# Patient Record
Sex: MALE | URBAN - METROPOLITAN AREA
[De-identification: names, ages, dates, MRNs, and addresses within clinical notes are randomized per-mention and may not be internally consistent; named-entity substitution may affect disease eponyms.]

---

## 2023-10-16 ENCOUNTER — OFFICE (OUTPATIENT)
Dept: URBAN - METROPOLITAN AREA CLINIC 28 | Facility: CLINIC | Age: 68
Setting detail: OPHTHALMOLOGY
End: 2023-10-16
Payer: MEDICARE

## 2023-10-16 DIAGNOSIS — H25.13: ICD-10-CM

## 2023-10-16 DIAGNOSIS — H43.813: ICD-10-CM

## 2023-10-16 DIAGNOSIS — H40.033: ICD-10-CM

## 2023-10-16 PROCEDURE — 92014 COMPRE OPH EXAM EST PT 1/>: CPT | Performed by: OPHTHALMOLOGY

## 2023-10-16 PROCEDURE — 92020 GONIOSCOPY: CPT | Performed by: OPHTHALMOLOGY

## 2023-10-16 ASSESSMENT — REFRACTION_CURRENTRX
OS_OVR_VA: 20/
OS_CYLINDER: SPH
OS_SPHERE: +3.00
OD_CYLINDER: SPH
OD_OVR_VA: 20/
OD_SPHERE: +3.50

## 2023-10-16 ASSESSMENT — REFRACTION_AUTOREFRACTION
OS_SPHERE: -0.25
OS_CYLINDER: -0.50
OD_AXIS: 000
OD_SPHERE: +0.75
OS_AXIS: 149
OD_CYLINDER: SPH

## 2023-10-16 ASSESSMENT — KERATOMETRY
OD_K2POWER_DIOPTERS: 42.50
OD_AXISANGLE_DEGREES: 097
OS_K2POWER_DIOPTERS: 42.75
OD_K1POWER_DIOPTERS: 41.25
OS_K1POWER_DIOPTERS: 41.25
OS_AXISANGLE_DEGREES: 078
METHOD_AUTO_MANUAL: AUTO

## 2023-10-16 ASSESSMENT — VISUAL ACUITY
OS_BCVA: 20/30-
OD_BCVA: 20/25

## 2023-10-16 ASSESSMENT — TONOMETRY
OD_IOP_MMHG: 12
OS_IOP_MMHG: 15

## 2023-10-16 ASSESSMENT — SPHEQUIV_DERIVED: OS_SPHEQUIV: -0.5

## 2023-10-16 ASSESSMENT — AXIALLENGTH_DERIVED: OS_AL: 24.362

## 2023-10-16 ASSESSMENT — CONFRONTATIONAL VISUAL FIELD TEST (CVF)
OD_FINDINGS: FULL
OS_FINDINGS: FULL

## 2024-05-05 ENCOUNTER — EMERGENCY (EMERGENCY)
Facility: HOSPITAL | Age: 69
LOS: 1 days | Discharge: HOME | End: 2024-05-05
Admitting: EMERGENCY MEDICINE
Payer: MEDICARE

## 2024-05-05 VITALS
RESPIRATION RATE: 98 BRPM | HEIGHT: 72 IN | TEMPERATURE: 98 F | SYSTOLIC BLOOD PRESSURE: 132 MMHG | HEART RATE: 71 BPM | OXYGEN SATURATION: 97 % | DIASTOLIC BLOOD PRESSURE: 81 MMHG | WEIGHT: 185.19 LBS

## 2024-05-05 VITALS — RESPIRATION RATE: 18 BRPM

## 2024-05-05 DIAGNOSIS — H57.89 OTHER SPECIFIED DISORDERS OF EYE AND ADNEXA: ICD-10-CM

## 2024-05-05 DIAGNOSIS — H10.9 UNSPECIFIED CONJUNCTIVITIS: ICD-10-CM

## 2024-05-05 DIAGNOSIS — H57.02 ANISOCORIA: ICD-10-CM

## 2024-05-05 DIAGNOSIS — Z88.0 ALLERGY STATUS TO PENICILLIN: ICD-10-CM

## 2024-05-05 DIAGNOSIS — H11.422 CONJUNCTIVAL EDEMA, LEFT EYE: ICD-10-CM

## 2024-05-05 PROCEDURE — 99284 EMERGENCY DEPT VISIT MOD MDM: CPT

## 2024-05-05 PROCEDURE — 99283 EMERGENCY DEPT VISIT LOW MDM: CPT

## 2024-05-05 RX ORDER — MOXIFLOXACIN HCL 0.5 %
1 DROPS OPHTHALMIC (EYE)
Qty: 1 | Refills: 0
Start: 2024-05-05 | End: 2024-05-09

## 2024-05-05 NOTE — ED PROVIDER NOTE - EYES, MLM
left eye +sclera injection, +anisocoria (not new per pt), +chemosis, EOMI, fluorescein stain neg for abrasion/ulcer/fb, visual acuity 20/50 OS, 20/40 OU

## 2024-05-05 NOTE — ED ADULT TRIAGE NOTE - CHIEF COMPLAINT QUOTE
Pt, presents to ER c/o left eye discharge and discomfort without relief from doxycyline for ~1 week. OD-20/30, OS-20-50 and OU-20/40

## 2024-05-05 NOTE — ED PROVIDER NOTE - OBJECTIVE STATEMENT
69 y/o m presents c/o left eye redness, itchiness and discharge x 1 week.  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  Pt stating his symptoms are not improving, the eye looked more red to him today.  Pt reports mild blurriness in the left eye, has been told in the past that his eye pressure is slightly high but wasn't given any drops for it.  Denies contact lenses, fever, recent URI sx, all other ROS negative.

## 2024-05-05 NOTE — ED ADULT NURSE NOTE - NSSEPSISSUSPECTED_ED_A_ED
Homeheatl[facility] care ordered for dc back at [facility]  Pt agreeable to [facility], referral made  No

## 2024-05-05 NOTE — ED PROVIDER NOTE - CLINICAL SUMMARY MEDICAL DECISION MAKING FREE TEXT BOX
67 y/o m presents c/o left eye redness, itchiness and discharge x 1 week.  Fluorescein stain neg, visual acuity slightly less in left eye, discussed with ophthalmology Dr. Bernabe, recommends to switch to moxifloxacin ophthalmic drops, recommend prompt [facility], advised to d/c oral doxycycline.  Pt in agreement with plan.

## 2024-05-05 NOTE — ED PROVIDER NOTE - NSFOLLOWUPINSTRUCTIONS_ED_ALL_ED_FT
Bacterial Conjunctivitis, Adult  Bacterial conjunctivitis is an infection of the clear membrane that covers the white part of the eye and the inner surface of the eyelid (conjunctiva). When the blood vessels in the conjunctiva become inflamed, the eye becomes red or pink. The eye often feels irritated or itchy. Bacterial conjunctivitis spreads easily from person to person (is contagious). It also spreads easily from one eye to the other eye.    What are the causes?  This condition is caused by bacteria. You may get the infection if you come into close contact with:  A person who is infected with the bacteria.  Items that are contaminated with the bacteria, such as a face towel, contact lens solution, or eye makeup.  What increases the risk?  You are more likely to develop this condition if:  You are exposed to other people who have the infection.  You wear contact lenses.  You have a sinus infection.  You have had a recent eye injury or surgery.  You have a weak body defense system (immune system).  You have a medical condition that causes dry eyes.  What are the signs or symptoms?  A normal eye compared to an eye with bacterial conjunctivitis.   Symptoms of this condition include:  Thick, yellowish discharge from the eye. This may turn into a crust on the eyelid overnight and cause your eyelids to stick together.  Tearing or watery eyes.  Itchy eyes.  Burning feeling in your eyes.  Eye redness.  Swollen eyelids.  Blurred vision.  How is this diagnosed?  This condition is diagnosed based on your symptoms and medical history. Your health care provider may also take a sample of discharge from your eye to find the cause of your infection.    How is this treated?  A person putting eye drops in an eye.  This condition may be treated with:  Antibiotic eye drops or ointment to clear the infection more quickly and prevent the spread of infection to others.  Antibiotic medicines taken by mouth (orally) to treat infections that do not respond to drops or ointments or that last longer than 10 days.  Cool, wet cloths (cool compresses) placed on the eyes.  Artificial tears applied 2–6 times a day.  Follow these instructions at home:  Medicines    Take or apply your antibiotic medicine as told by your health care provider. Do not stop using the antibiotic, even if your condition improves, unless directed by your health care provider.  Take or apply over-the-counter and prescription medicines only as told by your health care provider.  Be very careful to avoid touching the edge of your eyelid with the eye-drop bottle or the ointment tube when you apply medicines to the affected eye. This will keep you from spreading the infection to your other eye or to other people.  Managing discomfort    Gently wipe away any drainage from your eye with a warm, wet washcloth or a cotton ball.  Apply a clean, cool compress to your eye for 10–20 minutes, 3–4 times a day.  General instructions    Do not wear contact lenses until the inflammation is gone and your health care provider says it is safe to wear them again. Ask your health care provider how to sterilize or replace your contact lenses before you use them again. Wear glasses until you can resume wearing contact lenses.  Avoid wearing eye makeup until the inflammation is gone. Throw away any old eye cosmetics that may be contaminated.  Change or wash your pillowcase every day.  Do not share towels or washcloths. This may spread the infection.  Wash your hands often with soap and water for at least 20 seconds and especially before touching your face or eyes. Use paper towels to dry your hands.  Avoid touching or rubbing your eyes.  Do not drive or use heavy machinery if your vision is blurred.  Contact a health care provider if:  You have a fever.  Your symptoms do not get better after 10 days.  Get help right away if:  You have a fever and your symptoms suddenly get worse.  You have severe pain when you move your eye.  You have facial pain, redness, or swelling.  You have a sudden loss of vision.  Summary  Bacterial conjunctivitis is an infection of the clear membrane that covers the white part of the eye and the inner surface of the eyelid (conjunctiva).  Bacterial conjunctivitis spreads easily from eye to eye and from person to person (is contagious).  Wash your hands often with soap and water for at least 20 seconds and especially before touching your face or eyes. Use paper towels to dry your hands.  Take or apply your antibiotic medicine as told by your health care provider. Do not stop using the antibiotic even if your condition improves.  Contact a health care provider if you have a fever or if your symptoms do not get better after 10 days. Get help right away if you have a sudden loss of vision.  This information is not intended to replace advice given to you by your health care provider. Make sure you discuss any questions you have with your health care provider.

## 2024-05-14 ENCOUNTER — OFFICE (OUTPATIENT)
Dept: URBAN - METROPOLITAN AREA CLINIC 92 | Facility: CLINIC | Age: 69
Setting detail: OPHTHALMOLOGY
End: 2024-05-14
Payer: MEDICARE

## 2024-05-14 DIAGNOSIS — H16.223: ICD-10-CM

## 2024-05-14 DIAGNOSIS — B30.0: ICD-10-CM

## 2024-05-14 DIAGNOSIS — H25.13: ICD-10-CM

## 2024-05-14 DIAGNOSIS — H43.813: ICD-10-CM

## 2024-05-14 DIAGNOSIS — H40.033: ICD-10-CM

## 2024-05-14 DIAGNOSIS — H18.823: ICD-10-CM

## 2024-05-14 PROCEDURE — 99213 OFFICE O/P EST LOW 20 MIN: CPT | Performed by: OPHTHALMOLOGY

## 2024-05-14 ASSESSMENT — LID EXAM ASSESSMENTS
OS_EDEMA: 1+ 2+
OD_EDEMA: 1+ 2+

## 2024-06-27 ENCOUNTER — OFFICE (OUTPATIENT)
Dept: URBAN - METROPOLITAN AREA CLINIC 28 | Facility: CLINIC | Age: 69
Setting detail: OPHTHALMOLOGY
End: 2024-06-27
Payer: MEDICARE

## 2024-06-27 DIAGNOSIS — H16.223: ICD-10-CM

## 2024-06-27 DIAGNOSIS — B30.0: ICD-10-CM

## 2024-06-27 DIAGNOSIS — H18.823: ICD-10-CM

## 2024-06-27 DIAGNOSIS — H25.13: ICD-10-CM

## 2024-06-27 PROCEDURE — 92012 INTRM OPH EXAM EST PATIENT: CPT | Performed by: OPHTHALMOLOGY

## 2024-06-27 ASSESSMENT — CONFRONTATIONAL VISUAL FIELD TEST (CVF)
OD_FINDINGS: FULL
OS_FINDINGS: FULL

## 2024-06-27 ASSESSMENT — LID EXAM ASSESSMENTS
OS_EDEMA: 1+ 2+
OD_EDEMA: 1+ 2+

## 2024-10-22 ENCOUNTER — OFFICE (OUTPATIENT)
Dept: URBAN - METROPOLITAN AREA CLINIC 28 | Facility: CLINIC | Age: 69
Setting detail: OPHTHALMOLOGY
End: 2024-10-22
Payer: MEDICARE

## 2024-10-22 DIAGNOSIS — B30.0: ICD-10-CM

## 2024-10-22 DIAGNOSIS — H16.223: ICD-10-CM

## 2024-10-22 DIAGNOSIS — H25.13: ICD-10-CM

## 2024-10-22 DIAGNOSIS — H18.823: ICD-10-CM

## 2024-10-22 PROCEDURE — 92012 INTRM OPH EXAM EST PATIENT: CPT | Performed by: OPHTHALMOLOGY

## 2024-10-22 ASSESSMENT — KERATOMETRY
OD_K2POWER_DIOPTERS: 42.50
OS_K1POWER_DIOPTERS: 41.25
OD_K1POWER_DIOPTERS: 41.25
OS_K2POWER_DIOPTERS: 42.75
METHOD_AUTO_MANUAL: AUTO
OS_AXISANGLE_DEGREES: 080
OD_AXISANGLE_DEGREES: 095

## 2024-10-22 ASSESSMENT — REFRACTION_MANIFEST
OD_SPHERE: +0.75
OS_SPHERE: -1.00

## 2024-10-22 ASSESSMENT — REFRACTION_CURRENTRX
OD_OVR_VA: 20/
OS_OVR_VA: 20/
OS_CYLINDER: SPH
OS_SPHERE: +3.00
OD_SPHERE: +3.50
OD_CYLINDER: SPH

## 2024-10-22 ASSESSMENT — VISUAL ACUITY
OD_BCVA: 20/40-2
OS_BCVA: 20/50

## 2024-10-22 ASSESSMENT — REFRACTION_AUTOREFRACTION
OD_AXIS: 032
OS_CYLINDER: -0.50
OS_SPHERE: -0.75
OD_SPHERE: +0.75
OS_AXIS: 010
OD_CYLINDER: -0.25

## 2024-10-22 ASSESSMENT — CONFRONTATIONAL VISUAL FIELD TEST (CVF)
OD_FINDINGS: FULL
OS_FINDINGS: FULL

## 2024-10-22 ASSESSMENT — TONOMETRY
OS_IOP_MMHG: 17
OD_IOP_MMHG: 17

## 2024-10-22 ASSESSMENT — TEAR BREAK UP TIME (TBUT)
OD_TBUT: 2+
OS_TBUT: 2+

## 2024-10-22 ASSESSMENT — LID EXAM ASSESSMENTS
OS_EDEMA: 1+ 2+
OD_EDEMA: 1+ 2+

## 2025-07-08 ENCOUNTER — OFFICE (OUTPATIENT)
Dept: URBAN - METROPOLITAN AREA CLINIC 28 | Facility: CLINIC | Age: 70
Setting detail: OPHTHALMOLOGY
End: 2025-07-08
Payer: MEDICARE

## 2025-07-08 DIAGNOSIS — H40.033: ICD-10-CM

## 2025-07-08 DIAGNOSIS — H16.223: ICD-10-CM

## 2025-07-08 DIAGNOSIS — H43.813: ICD-10-CM

## 2025-07-08 DIAGNOSIS — H25.13: ICD-10-CM

## 2025-07-08 PROCEDURE — 92020 GONIOSCOPY: CPT | Performed by: OPHTHALMOLOGY

## 2025-07-08 PROCEDURE — 92014 COMPRE OPH EXAM EST PT 1/>: CPT | Performed by: OPHTHALMOLOGY

## 2025-07-08 ASSESSMENT — REFRACTION_CURRENTRX
OS_CYLINDER: SPH
OD_SPHERE: +3.50
OS_OVR_VA: 20/
OD_CYLINDER: SPH
OS_SPHERE: +3.00
OD_OVR_VA: 20/

## 2025-07-08 ASSESSMENT — REFRACTION_MANIFEST
OS_CYLINDER: SPH
OS_ADD: +2.25
OD_VA1: 20/30-2
OS_VA1: 20/40-
OS_SPHERE: -1.00
OS_CYLINDER: SPH
OD_SPHERE: +0.75
OD_CYLINDER: SPH
OD_SPHERE: +0.50
OD_CYLINDER: SPH
OS_SPHERE: -0.75
OD_ADD: +2.25
OD_SPHERE: +2.75
OS_SPHERE: +1.50

## 2025-07-08 ASSESSMENT — TONOMETRY
OD_IOP_MMHG: 17
OS_IOP_MMHG: 18

## 2025-07-08 ASSESSMENT — VISUAL ACUITY
OD_BCVA: 20/70-1
OS_BCVA: 20/50-2

## 2025-07-08 ASSESSMENT — KERATOMETRY
OD_AXISANGLE_DEGREES: 098
OS_K1POWER_DIOPTERS: 41.00
OD_K1POWER_DIOPTERS: 41.00
METHOD_AUTO_MANUAL: AUTO
OS_AXISANGLE_DEGREES: 076
OS_K2POWER_DIOPTERS: 42.25
OD_K2POWER_DIOPTERS: 42.50

## 2025-07-08 ASSESSMENT — CONFRONTATIONAL VISUAL FIELD TEST (CVF)
OD_FINDINGS: FULL
OS_FINDINGS: FULL

## 2025-07-08 ASSESSMENT — REFRACTION_AUTOREFRACTION
OS_CYLINDER: -0.25
OS_SPHERE: -1.00
OD_AXIS: 020
OD_SPHERE: +1.00
OD_CYLINDER: -1.00
OS_AXIS: 157

## 2025-07-08 ASSESSMENT — TEAR BREAK UP TIME (TBUT)
OS_TBUT: 2+
OD_TBUT: 2+